# Patient Record
Sex: FEMALE | Race: WHITE | NOT HISPANIC OR LATINO | Employment: FULL TIME | URBAN - METROPOLITAN AREA
[De-identification: names, ages, dates, MRNs, and addresses within clinical notes are randomized per-mention and may not be internally consistent; named-entity substitution may affect disease eponyms.]

---

## 2022-12-28 ENCOUNTER — HOSPITAL ENCOUNTER (EMERGENCY)
Facility: CLINIC | Age: 57
Discharge: HOME | End: 2022-12-28
Attending: EMERGENCY MEDICINE | Admitting: EMERGENCY MEDICINE
Payer: COMMERCIAL

## 2022-12-28 ENCOUNTER — APPOINTMENT (OUTPATIENT)
Dept: RADIOLOGY | Facility: CLINIC | Age: 57
End: 2022-12-28
Attending: EMERGENCY MEDICINE
Payer: COMMERCIAL

## 2022-12-28 VITALS
DIASTOLIC BLOOD PRESSURE: 74 MMHG | SYSTOLIC BLOOD PRESSURE: 148 MMHG | HEART RATE: 62 BPM | RESPIRATION RATE: 16 BRPM | WEIGHT: 277 LBS | TEMPERATURE: 98.4 F | OXYGEN SATURATION: 98 %

## 2022-12-28 DIAGNOSIS — R07.89 ATYPICAL CHEST PAIN: ICD-10-CM

## 2022-12-28 LAB
ANION GAP SERPL CALCULATED.3IONS-SCNC: 11 MMOL/L (ref 5–18)
ATRIAL RATE - MUSE: 67 BPM
BUN SERPL-MCNC: 20 MG/DL (ref 8–22)
CALCIUM SERPL-MCNC: 9.9 MG/DL (ref 8.5–10.5)
CHLORIDE BLD-SCNC: 106 MMOL/L (ref 98–107)
CO2 SERPL-SCNC: 22 MMOL/L (ref 22–31)
CREAT SERPL-MCNC: 0.81 MG/DL (ref 0.6–1.1)
DIASTOLIC BLOOD PRESSURE - MUSE: 81 MMHG
GFR SERPL CREATININE-BSD FRML MDRD: 84 ML/MIN/1.73M2
GLUCOSE BLD-MCNC: 101 MG/DL (ref 70–125)
INTERPRETATION ECG - MUSE: NORMAL
MAGNESIUM SERPL-MCNC: 1.5 MG/DL (ref 1.8–2.6)
P AXIS - MUSE: 64 DEGREES
POTASSIUM BLD-SCNC: 4.1 MMOL/L (ref 3.5–5)
PR INTERVAL - MUSE: 188 MS
QRS DURATION - MUSE: 84 MS
QT - MUSE: 420 MS
QTC - MUSE: 443 MS
R AXIS - MUSE: 64 DEGREES
SODIUM SERPL-SCNC: 139 MMOL/L (ref 136–145)
SYSTOLIC BLOOD PRESSURE - MUSE: 159 MMHG
T AXIS - MUSE: 69 DEGREES
TROPONIN I SERPL-MCNC: <0.01 NG/ML (ref 0–0.29)
VENTRICULAR RATE- MUSE: 67 BPM

## 2022-12-28 PROCEDURE — 83735 ASSAY OF MAGNESIUM: CPT | Performed by: EMERGENCY MEDICINE

## 2022-12-28 PROCEDURE — 93005 ELECTROCARDIOGRAM TRACING: CPT | Performed by: EMERGENCY MEDICINE

## 2022-12-28 PROCEDURE — 84484 ASSAY OF TROPONIN QUANT: CPT | Performed by: EMERGENCY MEDICINE

## 2022-12-28 PROCEDURE — 36415 COLL VENOUS BLD VENIPUNCTURE: CPT | Performed by: EMERGENCY MEDICINE

## 2022-12-28 PROCEDURE — 71046 X-RAY EXAM CHEST 2 VIEWS: CPT

## 2022-12-28 PROCEDURE — 80048 BASIC METABOLIC PNL TOTAL CA: CPT | Performed by: EMERGENCY MEDICINE

## 2022-12-28 PROCEDURE — 99285 EMERGENCY DEPT VISIT HI MDM: CPT | Mod: 25

## 2022-12-28 ASSESSMENT — ACTIVITIES OF DAILY LIVING (ADL): ADLS_ACUITY_SCORE: 35

## 2022-12-28 NOTE — ED TRIAGE NOTES
Pt arrives to the ED via EMS for c/o CP, dizziness, and nausea that started at 5am. Pt states the dizziness and nausea worsened at noon, which caused her to initiate EMS. EMS gave 324mg of Aspirin, and 1 nitroglycerin with relief. CP went from 5/10, to 2/10. Pt endorses hx of acid reflux. Pt denies any cardiac hx. Pt hypertensive for EMS. Pt states she is normally hypotensive.      Triage Assessment     Row Name 12/28/22 1245       Triage Assessment (Adult)    Airway WDL WDL       Respiratory WDL    Respiratory WDL X;rhythm/pattern    Rhythm/Pattern, Respiratory shortness of breath       Skin Circulation/Temperature WDL    Skin Circulation/Temperature WDL WDL       Cardiac WDL    Cardiac WDL X;chest pain       Chest Pain Assessment    Chest Pain Location midsternal    Chest Pain Radiation arm;back    Character pressure    Precipitating Factors at rest    Alleviating Factors other (comment)  nitro given by EMS       Peripheral/Neurovascular WDL    Peripheral Neurovascular WDL WDL       Cognitive/Neuro/Behavioral WDL    Cognitive/Neuro/Behavioral WDL WDL

## 2022-12-28 NOTE — Clinical Note
Oliva Ralph was seen and treated in our emergency department on 12/28/2022.  She may return to work on 12/29/2022.       If you have any questions or concerns, please don't hesitate to call.      Buddy Victor MD

## 2022-12-28 NOTE — ED PROVIDER NOTES
EMERGENCY DEPARTMENT ENCOUNTER      NAME: Oliva Ralph  AGE: 57 year old female  YOB: 1965  MRN: [id number]  EVALUATION DATE & TIME: 12/28/2022 12:40 PM    PCP: No primary care provider on file.    ED PROVIDER: Buddy Victor M.D.      Chief Complaint   Patient presents with     Chest Pain     Nausea     Dizziness         FINAL IMPRESSION:  Atypical chest pain      ED COURSE & MEDICAL DECISION MAKING:    Pertinent Labs & Imaging studies reviewed. (See chart for details)  57 year old female presents to the Emergency Department for evaluation of chest pressure and discomfort.  Symptoms first noted on awakening around 530 this morning.  Patient rested thereafter awakened with continued discomfort.  Is also appreciating radiation of discomfort to jaw and arm.  EKG on arrival is unremarkable.  Patient appears comfortable with normal vital signs.  Cardiac risk factors are minimal.  Patient is obese with diabetes but no hypertension, dyslipidemia.  Non-smoker.  Will obtain initial troponin.  Given length of symptomatology if cardiac related abnormality and blood testing should be likely.  Patient much more likely to be discharged with referral to rapid access clinic as hospitalization likely not indicated given presentation and heart score.. Patient appears non toxic with stable vitals signs. Overall exam is benign.  Patient reports receiving nitroglycerin and aspirin in route with resolution of much of her symptoms.        12:53 PM I met with the patient for the initial interview and physical examination. Discussed plan for treatment and workup in the ED.    2:12 PM.  Laboratory evaluation unremarkable other than slight hypomagnesemia at 1.5.  At the conclusion of the encounter I discussed the results of all of the tests and the disposition. The questions were answered and return precautions provided. The patient or family acknowledged understanding and was agreeable with the care plan.       PPE: Provider  wore N95 mask, eye protection, surgical cap    MEDICATIONS GIVEN IN THE EMERGENCY:  Medications - No data to display    NEW PRESCRIPTIONS STARTED AT TODAY'S ER VISIT  New Prescriptions    No medications on file          =================================================================    HPI    Patient information was obtained from: Patient    Use of Intrepreter: N/A        Oliva Ralph is a 57 year old female with a pertient medical history of DM (on Metformin)  who presents to the ED for evaluation of chest pain. Patient states that she woke up at 0500 (~8 hours ago) with heart burn and pain in her left back, chest, and arm. She fell back asleep and woke again with the same symptoms. At 1200 (~1 hour ago) she felt dizzy and nauseous which prompted her to call EMS. She was administered 324 mg of Aspirin and 1 nitroglycerin with relief. Chest pain went from a 5/10 to a 2/10. She notes that she has had similar symptoms before but not all at once. She was seen for palpitations years ago and was not diagnosed with anything.       REVIEW OF SYSTEMS   Constitutional:  Denies fever, chills  Respiratory:  Denies productive cough or increased work of breathing  Cardiovascular:  Denies palpitations. Endorses chest pain.  GI:  Denies abdominal pain, vomiting, or change in bowel or bladder habits. Endorses nausea.  Musculoskeletal:  Denies any new muscle/joint swelling. Endorses back and arm pain (left).  Skin:  Denies rash   Neurologic:  Denies focal weakness  All systems negative except as marked.     PAST MEDICAL HISTORY:  History reviewed. No pertinent past medical history.    PAST SURGICAL HISTORY:  History reviewed. No pertinent surgical history.      CURRENT MEDICATIONS:    No current facility-administered medications for this encounter.    Current Outpatient Medications:      melatonin 5 mg Tab, [MELATONIN 5 MG TAB] Take 5 mg by mouth bedtime., Disp: , Rfl:      traZODone (DESYREL) 50 MG tablet, [TRAZODONE (DESYREL) 50  MG TABLET] Take 75 mg by mouth bedtime. 1.5 tabs = 75 mg, Disp: , Rfl:     ALLERGIES:  No Known Allergies    FAMILY HISTORY:  History reviewed. No pertinent family history.    SOCIAL HISTORY:   Social History     Socioeconomic History     Marital status:      Spouse name: None     Number of children: None     Years of education: None     Highest education level: None   Tobacco Use     Smoking status: Never   Substance and Sexual Activity     Alcohol use: No   Social History Narrative    Lives with family        VITALS:  Patient Vitals for the past 24 hrs:   BP Pulse Resp Weight   12/28/22 1243 (!) 159/81 66 16 125.6 kg (277 lb)        PHYSICAL EXAM    Constitutional:  Awake, alert, in no apparent distress, obese female  HENT:  Normocephalic, Atraumatic. Bilateral external ears normal. Oropharynx moist. Nose normal. Neck- Normal range of motion  Eyes:  PERRL, EOMI with no signs of entrapment, Conjunctiva normal, No discharge.   Respiratory:  Normal breath sounds, No respiratory distress, No wheezing.    Cardiovascular:  Normal heart rate, Normal rhythm, No appreciable rubs or gallops.   GI:  Soft, No tenderness, No distension, No palpable masses  Musculoskeletal:  No edema. Good range of motion in all major joints.  Integument:  Warm, Dry, No erythema, No rash.   Neurologic:  Alert & oriented, Normal motor function, Normal sensory function, No focal deficits noted.   Psychiatric:  Affect normal, Judgment normal, Mood normal.     LAB:  All pertinent labs reviewed and interpreted.     Results for orders placed or performed during the hospital encounter of 12/28/22   Basic metabolic panel     Status: Normal   Result Value Ref Range    Sodium 139 136 - 145 mmol/L    Potassium 4.1 3.5 - 5.0 mmol/L    Chloride 106 98 - 107 mmol/L    Carbon Dioxide (CO2) 22 22 - 31 mmol/L    Anion Gap 11 5 - 18 mmol/L    Urea Nitrogen 20 8 - 22 mg/dL    Creatinine 0.81 0.60 - 1.10 mg/dL    Calcium 9.9 8.5 - 10.5 mg/dL    Glucose 101  70 - 125 mg/dL    GFR Estimate 84 >60 mL/min/1.73m2   Magnesium     Status: Abnormal   Result Value Ref Range    Magnesium 1.5 (L) 1.8 - 2.6 mg/dL   Troponin I     Status: Normal   Result Value Ref Range    Troponin I <0.01 0.00 - 0.29 ng/mL   ECG 12-LEAD WITH MUSE ([facility])     Status: None   Result Value Ref Range    Systolic Blood Pressure 159 mmHg    Diastolic Blood Pressure 81 mmHg    Ventricular Rate 67 BPM    Atrial Rate 67 BPM    MO Interval 188 ms    QRS Duration 84 ms     ms    QTc 443 ms    P Axis 64 degrees    R AXIS 64 degrees    T Axis 69 degrees    Interpretation ECG       Sinus rhythm  Normal ECG  When compared with ECG of 05-DEC-2014 11:43,  No significant change was found  Confirmed by SEE ED PROVIDER NOTE FOR, ECG INTERPRETATION ([id number]),  ABIODUN BORDEN ([id number]) on 12/28/2022 1:21:01 PM       RADIOLOGY:  Reviewed all pertinent imaging. Please see official radiology report.  No orders to display       EKG:    Normal sinus rhythm.  Rate of 67.  Normal QRS.  Normal ST segments.  Normal EKG.  Unchanged compared to December 5, 2014  I have independently reviewed and interpreted the EKG(s) documented above.    I, Yue Latham, am serving as a scribe to document services personally performed by Buddy Victor MD, based on my observation and the provider's statements to me. I, Buddy Victor MD attest that Yue Latham is acting in a scribe capacity, has observed my performance of the services and has documented them in accordance with my direction.    Buddy Victor M.D.  Emergency Medicine  [facility] EMERGENCY ROOM     Buddy Victor MD  12/28/22 [id number]

## 2022-12-28 NOTE — ED NOTES
Pt agrees with discharge instructions. Will follow up with [facility] clinic. No other questions at this time.

## 2023-05-13 ENCOUNTER — HEALTH MAINTENANCE LETTER (OUTPATIENT)
Age: 58
End: 2023-05-13

## 2024-07-20 ENCOUNTER — HEALTH MAINTENANCE LETTER (OUTPATIENT)
Age: 59
End: 2024-07-20

## 2025-05-17 ENCOUNTER — HEALTH MAINTENANCE LETTER (OUTPATIENT)
Age: 60
End: 2025-05-17

## 2025-08-09 ENCOUNTER — HEALTH MAINTENANCE LETTER (OUTPATIENT)
Age: 60
End: 2025-08-09